# Patient Record
Sex: MALE | Race: WHITE
[De-identification: names, ages, dates, MRNs, and addresses within clinical notes are randomized per-mention and may not be internally consistent; named-entity substitution may affect disease eponyms.]

---

## 2021-08-06 PROBLEM — Z00.00 ENCOUNTER FOR PREVENTIVE HEALTH EXAMINATION: Status: ACTIVE | Noted: 2021-08-06

## 2021-08-12 ENCOUNTER — APPOINTMENT (OUTPATIENT)
Dept: OTOLARYNGOLOGY | Facility: CLINIC | Age: 31
End: 2021-08-12
Payer: COMMERCIAL

## 2021-08-12 VITALS
HEIGHT: 70 IN | HEART RATE: 76 BPM | SYSTOLIC BLOOD PRESSURE: 111 MMHG | DIASTOLIC BLOOD PRESSURE: 77 MMHG | WEIGHT: 250 LBS | BODY MASS INDEX: 35.79 KG/M2

## 2021-08-12 PROCEDURE — 99204 OFFICE O/P NEW MOD 45 MIN: CPT

## 2021-08-12 RX ORDER — SODIUM CHLORIDE 0.65 %
AEROSOL, SPRAY (ML) NASAL
Refills: 0 | Status: ACTIVE | COMMUNITY

## 2021-08-12 RX ORDER — FLUTICASONE PROPIONATE 50 MCG
SPRAY, SUSPENSION NASAL
Refills: 0 | Status: ACTIVE | COMMUNITY

## 2021-08-12 RX ORDER — SERTRALINE 25 MG/1
TABLET, FILM COATED ORAL
Refills: 0 | Status: ACTIVE | COMMUNITY

## 2021-08-12 NOTE — REASON FOR VISIT
[Subsequent Evaluation] : a subsequent evaluation for [FreeTextEntry2] : Persistent sinus pressure at bridge of nose behind eyes

## 2021-08-12 NOTE — REVIEW OF SYSTEMS
[Sneezing] : sneezing [Seasonal Allergies] : seasonal allergies [Post Nasal Drip] : post nasal drip [Dizziness] : dizziness [Vertigo] : vertigo [Lightheadedness] : lightheadedness [Recurrent Sinus Infections] : recurrent sinus infections [Problem Snoring] : problem snoring [Discolored Nasal Discharge] : discolored nasal discharge [Snoring With Pauses] : snoring with pauses [Hoarseness] : hoarseness [Throat Clearing] : throat clearing [Eye Pain] : eye pain [Eyes Itch] : itching of the eyes [Cough] : cough [Anxiety] : anxiety [Depression] : depression [Negative] : Heme/Lymph [As Noted in HPI] : as noted in HPI [FreeTextEntry1] : sweating at night, feel warmer than others

## 2021-08-12 NOTE — ASSESSMENT
[FreeTextEntry1] : Afrin for 3 days, sinus rinse w sterile water, Flonase\par rx Augmentin\par \par f/u 2 weeks for CT PNS\par allergy eval

## 2021-08-12 NOTE — PHYSICAL EXAM
[Normal] : mucosa is normal [Midline] : trachea located in midline position [de-identified] : scarred AS [de-identified] : erythema, enlarged

## 2021-08-12 NOTE — HISTORY OF PRESENT ILLNESS
[de-identified] : 31 yr old male c/o long hx of radha-orbital pain AS and left cheek, not as bad now as when he was young.  But, still has a dull pressure.\par c/o fatigue\par +nasal congestion, occ discolored rhinorrhea\par Has used NS, Flonase qd, occ Afrin\par No antibiotic or dx sinusitis in a long time\par allergy eval Dr Crane when younger: trees, grass.  Never had allergy shots

## 2021-08-19 ENCOUNTER — NON-APPOINTMENT (OUTPATIENT)
Age: 31
End: 2021-08-19

## 2021-08-30 ENCOUNTER — APPOINTMENT (OUTPATIENT)
Dept: OTOLARYNGOLOGY | Facility: CLINIC | Age: 31
End: 2021-08-30
Payer: COMMERCIAL

## 2021-08-30 ENCOUNTER — RESULT REVIEW (OUTPATIENT)
Age: 31
End: 2021-08-30

## 2021-08-30 VITALS
HEIGHT: 70 IN | HEART RATE: 76 BPM | BODY MASS INDEX: 35.79 KG/M2 | DIASTOLIC BLOOD PRESSURE: 81 MMHG | WEIGHT: 250 LBS | SYSTOLIC BLOOD PRESSURE: 118 MMHG

## 2021-08-30 DIAGNOSIS — J01.90 ACUTE SINUSITIS, UNSPECIFIED: ICD-10-CM

## 2021-08-30 DIAGNOSIS — H57.12 OCULAR PAIN, LEFT EYE: ICD-10-CM

## 2021-08-30 DIAGNOSIS — J30.2 OTHER SEASONAL ALLERGIC RHINITIS: ICD-10-CM

## 2021-08-30 PROCEDURE — 70486 CT MAXILLOFACIAL W/O DYE: CPT

## 2021-08-30 PROCEDURE — 99213 OFFICE O/P EST LOW 20 MIN: CPT

## 2021-08-30 RX ORDER — AMOXICILLIN AND CLAVULANATE POTASSIUM 875; 125 MG/1; MG/1
875-125 TABLET, COATED ORAL
Qty: 20 | Refills: 0 | Status: DISCONTINUED | COMMUNITY
Start: 2021-08-12 | End: 2021-08-30

## 2021-08-30 NOTE — ASSESSMENT
[FreeTextEntry1] : CT  PNS: dev nasal septum, mild mucosal thickening at [facility]. Official report pending\par \par Neuro eval for poss ocular migraine

## 2021-08-30 NOTE — PHYSICAL EXAM
[Normal] : mucosa is normal [Midline] : trachea located in midline position [de-identified] : scarred AS [de-identified] : erythema, enlarged

## 2021-08-30 NOTE — HISTORY OF PRESENT ILLNESS
[de-identified] : 31 yr old male c/o long hx of radha-orbital pain AS and left cheek, not as bad now as when he was young. Not generally helped by NSAID, tylenol.  Tends to go to sleep when it happensNot sure if there a FH of migraine\par +nasal congestion, occ discolored rhinorrhea\par Has used NS, Flonase qd, occ Afrin\par No antibiotic or dx sinusitis in a long time\par allergy eval Dr Crane when younger: trees, grass.  Never had allergy shots\par \par tx 8/1 w Augmentin, but had to stop it after a week due to diarrhea\par comes in today for CT PNS\par using Flonase and Sinus rinse daily

## 2021-08-30 NOTE — REVIEW OF SYSTEMS
[Sneezing] : sneezing [Seasonal Allergies] : seasonal allergies [Post Nasal Drip] : post nasal drip [Dizziness] : dizziness [Vertigo] : vertigo [Lightheadedness] : lightheadedness [As Noted in HPI] : as noted in HPI [Recurrent Sinus Infections] : recurrent sinus infections [Problem Snoring] : problem snoring [Discolored Nasal Discharge] : discolored nasal discharge [Snoring With Pauses] : snoring with pauses [Hoarseness] : hoarseness [Throat Clearing] : throat clearing [Eye Pain] : eye pain [Eyes Itch] : itching of the eyes [Cough] : cough [Anxiety] : anxiety [Depression] : depression [Negative] : Heme/Lymph [FreeTextEntry1] : sweating at night, feel warmer than others

## 2021-09-20 ENCOUNTER — APPOINTMENT (OUTPATIENT)
Dept: PEDIATRIC ALLERGY IMMUNOLOGY | Facility: CLINIC | Age: 31
End: 2021-09-20